# Patient Record
Sex: MALE | Race: BLACK OR AFRICAN AMERICAN | URBAN - METROPOLITAN AREA
[De-identification: names, ages, dates, MRNs, and addresses within clinical notes are randomized per-mention and may not be internally consistent; named-entity substitution may affect disease eponyms.]

---

## 2018-01-26 ENCOUNTER — HOSPITAL ENCOUNTER (EMERGENCY)
Facility: CLINIC | Age: 43
Discharge: HOME | End: 2018-01-26
Attending: EMERGENCY MEDICINE | Admitting: EMERGENCY MEDICINE

## 2018-01-26 VITALS
SYSTOLIC BLOOD PRESSURE: 153 MMHG | HEIGHT: 68 IN | WEIGHT: 315 LBS | OXYGEN SATURATION: 100 % | DIASTOLIC BLOOD PRESSURE: 100 MMHG | BODY MASS INDEX: 47.74 KG/M2 | HEART RATE: 76 BPM | RESPIRATION RATE: 16 BRPM | TEMPERATURE: 98 F

## 2018-01-26 DIAGNOSIS — Z86.79 HISTORY OF CORONARY ARTERY DISEASE: ICD-10-CM

## 2018-01-26 DIAGNOSIS — Z86.2 HISTORY OF SICKLE CELL DISEASE: ICD-10-CM

## 2018-01-26 DIAGNOSIS — R07.9 ACUTE CHEST PAIN: ICD-10-CM

## 2018-01-26 DIAGNOSIS — M25.551 HIP PAIN, RIGHT: ICD-10-CM

## 2018-01-26 LAB
ALBUMIN SERPL-MCNC: 3.4 G/DL (ref 3.4–5)
ALP SERPL-CCNC: 89 U/L (ref 40–150)
ALT SERPL W P-5'-P-CCNC: 24 U/L (ref 0–70)
ANION GAP SERPL CALCULATED.3IONS-SCNC: 5 MMOL/L (ref 3–14)
AST SERPL W P-5'-P-CCNC: 19 U/L (ref 0–45)
BASOPHILS # BLD AUTO: 0 10E9/L (ref 0–0.2)
BASOPHILS NFR BLD AUTO: 0.2 %
BILIRUB SERPL-MCNC: 0.1 MG/DL (ref 0.2–1.3)
BUN SERPL-MCNC: 23 MG/DL (ref 7–30)
CALCIUM SERPL-MCNC: 9.3 MG/DL (ref 8.5–10.1)
CHLORIDE SERPL-SCNC: 109 MMOL/L (ref 94–109)
CO2 SERPL-SCNC: 24 MMOL/L (ref 20–32)
CREAT SERPL-MCNC: 1.94 MG/DL (ref 0.66–1.25)
DIFFERENTIAL METHOD BLD: ABNORMAL
EOSINOPHIL # BLD AUTO: 0.1 10E9/L (ref 0–0.7)
EOSINOPHIL NFR BLD AUTO: 1 %
ERYTHROCYTE [DISTWIDTH] IN BLOOD BY AUTOMATED COUNT: 13.3 % (ref 10–15)
GFR SERPL CREATININE-BSD FRML MDRD: 38 ML/MIN/1.7M2
GLUCOSE SERPL-MCNC: 100 MG/DL (ref 70–99)
HCT VFR BLD AUTO: 31.3 % (ref 40–53)
HGB BLD-MCNC: 10.6 G/DL (ref 13.3–17.7)
IMM GRANULOCYTES # BLD: 0 10E9/L (ref 0–0.4)
IMM GRANULOCYTES NFR BLD: 0.3 %
LYMPHOCYTES # BLD AUTO: 3.5 10E9/L (ref 0.8–5.3)
LYMPHOCYTES NFR BLD AUTO: 40.4 %
MCH RBC QN AUTO: 31 PG (ref 26.5–33)
MCHC RBC AUTO-ENTMCNC: 33.9 G/DL (ref 31.5–36.5)
MCV RBC AUTO: 92 FL (ref 78–100)
MONOCYTES # BLD AUTO: 1.4 10E9/L (ref 0–1.3)
MONOCYTES NFR BLD AUTO: 16.5 %
NEUTROPHILS # BLD AUTO: 3.6 10E9/L (ref 1.6–8.3)
NEUTROPHILS NFR BLD AUTO: 41.6 %
PLATELET # BLD AUTO: 280 10E9/L (ref 150–450)
POTASSIUM SERPL-SCNC: 4.1 MMOL/L (ref 3.4–5.3)
PROT SERPL-MCNC: 7.1 G/DL (ref 6.8–8.8)
RBC # BLD AUTO: 3.42 10E12/L (ref 4.4–5.9)
SODIUM SERPL-SCNC: 138 MMOL/L (ref 133–144)
TROPONIN I SERPL-MCNC: 0.02 UG/L (ref 0–0.04)
WBC # BLD AUTO: 8.7 10E9/L (ref 4–11)

## 2018-01-26 PROCEDURE — 93010 ELECTROCARDIOGRAM REPORT: CPT | Mod: Z6 | Performed by: EMERGENCY MEDICINE

## 2018-01-26 PROCEDURE — 80053 COMPREHEN METABOLIC PANEL: CPT | Performed by: EMERGENCY MEDICINE

## 2018-01-26 PROCEDURE — 93005 ELECTROCARDIOGRAM TRACING: CPT

## 2018-01-26 PROCEDURE — 99284 EMERGENCY DEPT VISIT MOD MDM: CPT | Mod: 25

## 2018-01-26 PROCEDURE — 99284 EMERGENCY DEPT VISIT MOD MDM: CPT | Mod: 25 | Performed by: EMERGENCY MEDICINE

## 2018-01-26 PROCEDURE — 84484 ASSAY OF TROPONIN QUANT: CPT | Performed by: EMERGENCY MEDICINE

## 2018-01-26 PROCEDURE — 85025 COMPLETE CBC W/AUTO DIFF WBC: CPT | Performed by: EMERGENCY MEDICINE

## 2018-01-26 RX ORDER — HYDROXYUREA 500 MG/1
CAPSULE ORAL DAILY
COMMUNITY

## 2018-01-26 RX ORDER — BUDESONIDE AND FORMOTEROL FUMARATE DIHYDRATE 80; 4.5 UG/1; UG/1
2 AEROSOL RESPIRATORY (INHALATION) 2 TIMES DAILY
COMMUNITY

## 2018-01-26 ASSESSMENT — ENCOUNTER SYMPTOMS
EYES NEGATIVE: 1
NEUROLOGICAL NEGATIVE: 1
HEMATOLOGIC/LYMPHATIC NEGATIVE: 1
CHEST TIGHTNESS: 1
ALLERGIC/IMMUNOLOGIC NEGATIVE: 1
CONSTITUTIONAL NEGATIVE: 1
GASTROINTESTINAL NEGATIVE: 1
ENDOCRINE NEGATIVE: 1
PSYCHIATRIC NEGATIVE: 1

## 2018-01-26 NOTE — ED AVS SNAPSHOT
[facility] Emergency Department    [address] [facility] [address] [address]    Phone:  [phone]    Fax:  [phone]                                       Hosea Deutsch   MRN: [id number]    Department:  [facility] Emergency Department   Date of Visit:  1/26/2018           After Visit Summary Signature Page     I have received my discharge instructions, and my questions have been answered. I have discussed any challenges I see with this plan with the nurse or doctor.    ..........................................................................................................................................  Patient/Patient Representative Signature      ..........................................................................................................................................  Patient Representative Print Name and Relationship to Patient    ..................................................               ................................................  Date                                            Time    ..........................................................................................................................................  Reviewed by Signature/Title    ...................................................              ..............................................  Date                                                            Time

## 2018-01-26 NOTE — ED AVS SNAPSHOT
[facility] Emergency Department    [address] [facility] [address]    Phone:  [phone]    Fax:  [phone]                                       Hosea Deutsch   MRN: [id number]    Department:  [facility] Emergency Department   Date of Visit:  1/26/2018           Patient Information     Date Of Birth          1975        Your diagnoses for this visit were:     Acute chest pain resolved on ED arrival after taking 3 sublingual nitroglycerin by report    History of coronary artery disease Reported history of PCI with stents and CABG    History of sickle cell disease        You were seen by Jonathan Alford MD.      Follow-up Information     Follow up with [facility] Emergency Department.    Specialty:  EMERGENCY MEDICINE    Why:  As needed, If symptoms worsen    Contact information:    [address] [place] [id number] [phone]    Additional information:    The medical center is located at   [address] [facility] (between [address] in [place], [place] miles north   of [place]).        Discharge Instructions          *CHEST PAIN, UNCERTAIN CAUSE    Based on your exam today, the exact cause of your chest pain is not certain. Your condition does not seem serious at this time, and your pain does not appear to be coming from your heart. However, sometimes the signs of a serious problem take more time to appear. Therefore, watch for the warning signs listed below.  HOME CARE:  1. Rest today and avoid strenuous activity.  2. Take any prescribed medicine as directed.  FOLLOW UP with your doctor in 1-3 days.   GET PROMPT MEDICAL ATTENTION if any of the following occur:    A change in the type of pain: if it feels different, becomes more severe, lasts longer, or begins to spread into your shoulder, arm, neck, jaw or back    Shortness of breath or increased pain with breathing    Weakness, dizziness, or fainting    Cough with blood or dark colored sputum  (phlegm)    Fever over 101  F (38.3  C)    Swelling, pain or redness in one leg    4322-9832 [employer]. [address] [place] [address], [place] [address]. All rights reserved. This information is not intended as a substitute for professional medical care. Always follow your healthcare professional's instructions.  This information has been modified by your health care provider with permission from the publisher.      24 Hour Appointment Hotline       To make an appointment at any [facility], call [phone] ([phone]). If you don't have a family doctor or clinic, we will help you find one. [facility] clinics are conveniently located to serve the needs of you and your family.             Review of your medicines      Our records show that you are taking the medicines listed below. If these are incorrect, please call your family doctor or clinic.        Dose / Directions Last dose taken    aspirin 81 MG tablet   Dose:  81 mg        Take 81 mg by mouth daily   Refills:  0        budesonide-formoterol 80-4.5 MCG/ACT Inhaler   Commonly known as:  SYMBICORT   Dose:  2 puff        Inhale 2 puffs into the lungs 2 times daily   Refills:  0        CLONIDINE HCL PO   Dose:  0.1 mg        Take 0.1 mg by mouth   Refills:  0        DILAUDID PO   Dose:  2 mg        Take 2 mg by mouth every 2 hours as needed for moderate to severe pain   Refills:  0        ENALAPRIL MALEATE PO        Refills:  0        hydroxyurea 500 MG capsule CHEMO   Commonly known as:  HYDREA        Take by mouth daily   Refills:  0        METOPROLOL TARTRATE PO   Dose:  25 mg        Take 25 mg by mouth 2 times daily   Refills:  0        SIMVASTATIN PO   Dose:  20 mg        Take 20 mg by mouth   Refills:  0                Procedures and tests performed during your visit     CBC with platelets differential    Comprehensive metabolic panel    EKG 12 lead    Troponin I      Orders Needing Specimen Collection     None      Pending Results      No orders found from 1/24/2018 to 1/27/2018.            Pending Culture Results     No orders found from 1/24/2018 to 1/27/2018.            Pending Results Instructions     If you had any lab results that were not finalized at the time of your Discharge, you can call the ED Lab Result RN at [phone]. You will be contacted by this team for any positive Lab results or changes in treatment. The nurses are available 7 days a week from 10A to 6:30P.  You can leave a message 24 hours per day and they will return your call.        Test Results From Your Hospital Stay        1/26/2018  8:20 PM      Component Results     Component Value Ref Range & Units Status    WBC 8.7 4.0 - 11.0 10e9/L Final    RBC Count 3.42 (L) 4.4 - 5.9 10e12/L Final    Hemoglobin 10.6 (L) 13.3 - 17.7 g/dL Final    Hematocrit 31.3 (L) 40.0 - 53.0 % Final    MCV 92 78 - 100 fl Final    MCH 31.0 26.5 - 33.0 pg Final    MCHC 33.9 31.5 - 36.5 g/dL Final    RDW 13.3 10.0 - 15.0 % Final    Platelet Count 280 150 - 450 10e9/L Final    Diff Method Automated Method  Final    % Neutrophils 41.6 % Final    % Lymphocytes 40.4 % Final    % Monocytes 16.5 % Final    % Eosinophils 1.0 % Final    % Basophils 0.2 % Final    % Immature Granulocytes 0.3 % Final    Absolute Neutrophil 3.6 1.6 - 8.3 10e9/L Final    Absolute Lymphocytes 3.5 0.8 - 5.3 10e9/L Final    Absolute Monocytes 1.4 (H) 0.0 - 1.3 10e9/L Final    Absolute Eosinophils 0.1 0.0 - 0.7 10e9/L Final    Absolute Basophils 0.0 0.0 - 0.2 10e9/L Final    Abs Immature Granulocytes 0.0 0 - 0.4 10e9/L Final         1/26/2018  8:39 PM      Component Results     Component Value Ref Range & Units Status    Sodium 138 133 - 144 mmol/L Final    Potassium 4.1 3.4 - 5.3 mmol/L Final    Chloride 109 94 - 109 mmol/L Final    Carbon Dioxide 24 20 - 32 mmol/L Final    Anion Gap 5 3 - 14 mmol/L Final    Glucose 100 (H) 70 - 99 mg/dL Final    Urea Nitrogen 23 7 - 30 mg/dL Final    Creatinine 1.94 (H) 0.66 - 1.25 mg/dL  "Final    GFR Estimate 38 (L) >60 mL/min/1.7m2 Final    Non  GFR Calc    GFR Estimate If Black 46 (L) >60 mL/min/1.7m2 Final    African American GFR Calc    Calcium 9.3 8.5 - 10.1 mg/dL Final    Bilirubin Total 0.1 (L) 0.2 - 1.3 mg/dL Final    Albumin 3.4 3.4 - 5.0 g/dL Final    Protein Total 7.1 6.8 - 8.8 g/dL Final    Alkaline Phosphatase 89 40 - 150 U/L Final    ALT 24 0 - 70 U/L Final    AST 19 0 - 45 U/L Final         2018  8:39 PM      Component Results     Component Value Ref Range & Units Status    Troponin I ES 0.020 0.000 - 0.045 ug/L Final    The 99th percentile for upper reference range is 0.045 ug/L.  Troponin values   in the range of 0.045 - 0.120 ug/L may be associated with risks of adverse   clinical events.                  Thank you for choosing [facility]       Thank you for choosing [facility] for your care. Our goal is always to provide you with excellent care. Hearing back from our patients is one way we can continue to improve our services. Please take a few minutes to complete the written survey that you may receive in the mail after you visit with us. Thank you!        [employer]s Information     [employer]Logs lets you send messages to your doctor, view your test results, renew your prescriptions, schedule appointments and more. To sign up, go to [email or web address] . Click on \"Log in\" on the left side of the screen, which will take you to the Welcome page. Then click on \"Sign up Now\" on the right side of the page.     You will be asked to enter the access code listed below, as well as some personal information. Please follow the directions to create your username and password.     Your access code is: [email or web address]  Expires: 2018  8:48 PM     Your access code will  in 90 days. If you need help or a new code, please call your [facility] clinic or [phone].        Care EveryWhere ID     This is your Care EveryWhere ID. This could be used by other organizations to access " your [facility] medical records  [id number]        Equal Access to Services     MINNIE DE LA CRUZ : Leelee Garcia, elvira boone, wesley riggs, augusto workman. So [facility] [phone].    ATENCIÓN: Si habla español, tiene a hylton disposición servicios gratuitos de asistencia lingüística. Llame al [phone].    We comply with applicable federal civil rights laws and [place] laws. We do not discriminate on the basis of race, color, national origin, age, disability, sex, sexual orientation, or gender identity.            After Visit Summary       This is your record. Keep this with you and show to your community pharmacist(s) and doctor(s) at your next visit.

## 2018-01-27 NOTE — ED NOTES
Pt wants to go out and get his hydromorphone for his hip pain. This nurse instructed against going outside. Told pt I would inform MD of his pain

## 2018-01-27 NOTE — ED NOTES
PICC line accessed and labs obtained. Denies chest pain currently. Labs drawn from red port. NSR on monitor.

## 2018-01-27 NOTE — ED NOTES
States cp started 1.5 hours ago. Took 3 sl ntg and the cp went away. Had a CABG in 2013. MI and 2 stents in 2000. 2015 had mitral valve replacement and 2 stents. Has bone CA and got chemo on Wednesday. Has pic line right arm.

## 2018-01-27 NOTE — ED PROVIDER NOTES
History     Chief Complaint   Patient presents with     Chest Pain     chest pain started 1 hour ago.   stent placement 2013 and 2015   pt had chemo wednesday for bone cancer.     HPI  Hosea Deutsch is a 43 year old male with a reported history of sickle cell disease, history of coronary disease status post CABG with PCI and stent who presents for evaluation of chest pain and discomfort as well as right hip pain.  Patient tells me he is visiting from [facility].  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  He reports he is scheduled for surgery on February 5.  Patient reports he is on multiple medications currently including a baby aspirin daily, enalapril 20 mg a day, hydroxyurea 500 mg a day, and metoprolol 25 mg twice a day.  Patient also reports he takes clonidine 0.1 mg a day, simvastatin 20 mg a day, and Dilaudid 2 mg every 2-3 hours.  He reports an allergy to morphine and Toradol causing rash.  He also reports he is on Lovenox 90 mg twice a day.  He tells me he has a CABG in 2013 and stents in 2000.  He reports he is visiting his partner to celebrate his birthday weekend and [facility].  He reports he had chest pain about 90 minutes prior to ED arrival that began suddenly.  He has a PICC line in his right upper arm.  Patient reports he took sublingual nitro which seemed to resolve his pain.  He came into the emergency department to be checked out.  On arrival in the emergency department he does not endorse any pain or discomfort in the chest.  He also does not report feeling short of breath.  He does report pain in his right hip and femur.  He tells me his pain is due to his osteosarcoma.    Problem List:    There are no active problems to display for this patient.       Past Medical History:    No past medical history on file.    Past Surgical History:    No past surgical history on file.    Family History:    No family history on  "file.    Social History:  Marital Status:    Social History   Substance Use Topics     Smoking status: Not on file     Smokeless tobacco: Not on file     Alcohol use Not on file        Medications:      METOPROLOL TARTRATE PO   aspirin 81 MG tablet   ENALAPRIL MALEATE PO   hydroxyurea (HYDREA) 500 MG capsule CHEMO   budesonide-formoterol (SYMBICORT) 80-4.5 MCG/ACT Inhaler   CLONIDINE HCL PO   SIMVASTATIN PO   HYDROmorphone HCl (DILAUDID PO)         Review of Systems   Constitutional: Negative.    HENT: Negative.    Eyes: Negative.    Respiratory: Positive for chest tightness.    Cardiovascular: Positive for chest pain.   Gastrointestinal: Negative.    Endocrine: Negative.    Genitourinary: Negative.    Musculoskeletal:        Right hip pain   Skin: Negative.    Allergic/Immunologic: Negative.    Neurological: Negative.    Hematological: Negative.    Psychiatric/Behavioral: Negative.    All other systems reviewed and are negative.      Physical Exam   BP: (!) 163/98  Heart Rate: 89  Temp: 97.7  F (36.5  C)  Resp: 18  Height: 172.7 cm (5' 8\")  Weight: (!) 191 kg (421 lb 1.3 oz)  SpO2: 100 %      Physical Exam   Constitutional: He is oriented to person, place, and time. He appears well-developed and well-nourished. No distress.   HENT:   Head: Normocephalic and atraumatic.   Eyes: Conjunctivae and EOM are normal. Pupils are equal, round, and reactive to light. Right eye exhibits no discharge. Left eye exhibits no discharge. No scleral icterus.   Neck: Normal range of motion. Neck supple. No JVD present. No tracheal deviation present. No thyromegaly present.   Cardiovascular: Normal rate and regular rhythm.  Exam reveals no gallop and no friction rub.    No murmur heard.  Pulmonary/Chest: Effort normal and breath sounds normal. No stridor. No respiratory distress. He has no wheezes. He has no rales. He exhibits no tenderness.   Abdominal: Soft. Bowel sounds are normal. He exhibits no distension and no mass. There is no " tenderness. There is no rebound and no guarding.   Lymphadenopathy:     He has no cervical adenopathy.   Neurological: He is alert and oriented to person, place, and time. He displays normal reflexes. No cranial nerve deficit. He exhibits normal muscle tone. Coordination normal.   Skin: No rash noted. He is not diaphoretic. No erythema. No pallor.   Psychiatric: He has a normal mood and affect. His behavior is normal. Judgment and thought content normal.       ED Course     ED Course     Procedures               EKG Interpretation:      Interpreted by Jonathan Alford  Time reviewed:20:00  Symptoms at time of EKG: chest pain   Rhythm: normal sinus   Rate: Normal  Axis: Normal  Ectopy: none  Conduction: normal  ST Segments/ T Waves: Non-specific ST-T wave changes  Q Waves: nonspecific  Comparison to prior: No old EKG available    Clinical Impression: no acute changes        Critical Care time:  none                 ED medications:    ED labs and imaging:  Results for orders placed or performed during the hospital encounter of 01/26/18   CBC with platelets differential   Result Value Ref Range    WBC 8.7 4.0 - 11.0 10e9/L    RBC Count 3.42 (L) 4.4 - 5.9 10e12/L    Hemoglobin 10.6 (L) 13.3 - 17.7 g/dL    Hematocrit 31.3 (L) 40.0 - 53.0 %    MCV 92 78 - 100 fl    MCH 31.0 26.5 - 33.0 pg    MCHC 33.9 31.5 - 36.5 g/dL    RDW 13.3 10.0 - 15.0 %    Platelet Count 280 150 - 450 10e9/L    Diff Method Automated Method     % Neutrophils 41.6 %    % Lymphocytes 40.4 %    % Monocytes 16.5 %    % Eosinophils 1.0 %    % Basophils 0.2 %    % Immature Granulocytes 0.3 %    Absolute Neutrophil 3.6 1.6 - 8.3 10e9/L    Absolute Lymphocytes 3.5 0.8 - 5.3 10e9/L    Absolute Monocytes 1.4 (H) 0.0 - 1.3 10e9/L    Absolute Eosinophils 0.1 0.0 - 0.7 10e9/L    Absolute Basophils 0.0 0.0 - 0.2 10e9/L    Abs Immature Granulocytes 0.0 0 - 0.4 10e9/L   Comprehensive metabolic panel   Result Value Ref Range    Sodium 138 133 - 144 mmol/L     Potassium 4.1 3.4 - 5.3 mmol/L    Chloride 109 94 - 109 mmol/L    Carbon Dioxide 24 20 - 32 mmol/L    Anion Gap 5 3 - 14 mmol/L    Glucose 100 (H) 70 - 99 mg/dL    Urea Nitrogen 23 7 - 30 mg/dL    Creatinine 1.94 (H) 0.66 - 1.25 mg/dL    GFR Estimate 38 (L) >60 mL/min/1.7m2    GFR Estimate If Black 46 (L) >60 mL/min/1.7m2    Calcium 9.3 8.5 - 10.1 mg/dL    Bilirubin Total 0.1 (L) 0.2 - 1.3 mg/dL    Albumin 3.4 3.4 - 5.0 g/dL    Protein Total 7.1 6.8 - 8.8 g/dL    Alkaline Phosphatase 89 40 - 150 U/L    ALT 24 0 - 70 U/L    AST 19 0 - 45 U/L   Troponin I   Result Value Ref Range    Troponin I ES 0.020 0.000 - 0.045 ug/L         ED Vitals:  Vitals:    01/26/18 1950 01/26/18 1951 01/26/18 2000 01/26/18 2015   BP: (!) 165/107  (!) 146/108    Resp:   11 9   Temp:       TempSrc:       SpO2:  99% 99% 98%   Weight:       Height:         Assessments & Plan (with Medical Decision Making)   Clinical impression: 43-year-old -American male who presented for concern for sudden onset of chest pain and discomfort.  Patient tells me he has a history of sickle cell disease, he also has a history of osteosarcoma in his right femur and right hip.  He also reports a history of coronary artery disease. He reports he had PCI with stenting in 2000 and a CABG in 2013.  He is here in [facility] visiting from [facility].  He tells me it is his birthday weekend and is here visiting his partner.  Patient reports he is currently on metoprolol 25 mg twice a day, enalapril 20 mg a day, hydroxyurea 500 mg a day, he takes a baby aspirin daily, he reports he takes Symbicort daily, clonidine 0.1mg, simvastatin 20 mg daily, Dilaudid 2 mg every 2-3 hours as needed.  Patient reports he has a PICC line in place.  He is currently on Lovenox 90 mg twice a day.  He tells me he has had osteosarcoma on 3 different occasions and has been in remission and his cancer recurred about a month ago and is currently on chemotherapy.  He tells me  he last had chemo couple of days ago.  He reports taking 3 sublingual nitroglycerin his chest pain resolved.  Because of his history of coronary artery disease he is here in the emergency department to be assessed and evaluated.  His EKG on ED arrival shows a normal sinus rhythm.  No acute ischemia.  T-wave flattening in aVL.  He has no acute arrhythmia appreciated.  There is no old EKG for comparison.  On my exam he is in no acute distress.  He was initially hypertensive in triage his blood pressure improved from 163/ 6/108      ED Course and Plan:  He is monitored on telemetry.  With his report of history of coronary artery disease with CABG and stents lab was obtained including a troponin.  His arrival troponin which was obtained about 2 hours after his reported onset of symptoms is within normal range of 0.020.  Patient was requesting pain medication.  He was told that he would have to have a ride pick him up from the emergency department if he was given pain medication.  He reports he takes Dilaudid 2 mg every 2-3 hours as needed.  After reviewing hospital policy and options for care for pain management and his request for IV opiates patient elected to go home and take his home medication.  He reports he drove himself to the emergency department but is requesting a cab ride home because he is has pain and discomfort overlying his right hip and femur where he tells me he has a history of osteosarcoma.  He is discharged to home as he remained pain-free his arrival EKG is normal.  Patient certainly has risk factors for coronary artery disease but reports he took his aspirin today.  He was not given any aspirin therapy in the emergency department because he takes aspirin daily.  He was also asymptomatic during my exam and evaluation.  His primary concern and complaint was hip pain.  Patient tells me he cannot drive himself home and needs to be driven home in a cab ride due to pain in his hip.  I offered to  provide pain control for his hip because he takes Dilaudid chronically but that he would have to have a right pick him up and I could not discharge him in a cab after getting IV opiates from the emergency depart.  Patient subsequent left emergency department after his lab results were reviewed with him.  His behavior in the ED is suspicious for drug-seeking behavior.        Disclaimer: This note consists of symbols derived from keyboarding, dictation and/or voice recognition software. As a result, there may be errors in the script that have gone undetected. Please consider this when interpreting information found in this chart.  I have reviewed the nursing notes.    I have reviewed the findings, diagnosis, plan and need for follow up with the patient.       New Prescriptions    No medications on file       Final diagnoses:   Acute chest pain - resolved on ED arrival after taking 3 sublingual nitroglycerin by report   History of coronary artery disease - Reported history of PCI with stents and CABG   History of sickle cell disease   Hip pain, right - Reported history of osteosarcoma on chemotherapy       1/26/2018   [facility] EMERGENCY DEPARTMENT     Jonathan Alford MD  01/27/18 0159

## 2018-01-27 NOTE — DISCHARGE INSTRUCTIONS
*CHEST PAIN, UNCERTAIN CAUSE    Based on your exam today, the exact cause of your chest pain is not certain. Your condition does not seem serious at this time, and your pain does not appear to be coming from your heart. However, sometimes the signs of a serious problem take more time to appear. Therefore, watch for the warning signs listed below.  HOME CARE:  1. Rest today and avoid strenuous activity.  2. Take any prescribed medicine as directed.  FOLLOW UP with your doctor in 1-3 days.   GET PROMPT MEDICAL ATTENTION if any of the following occur:    A change in the type of pain: if it feels different, becomes more severe, lasts longer, or begins to spread into your shoulder, arm, neck, jaw or back    Shortness of breath or increased pain with breathing    Weakness, dizziness, or fainting    Cough with blood or dark colored sputum (phlegm)    Fever over 101  F (38.3  C)    Swelling, pain or redness in one leg    4989-3525 The [employer]. [address] [place] [address]. All rights reserved. This information is not intended as a substitute for professional medical care. Always follow your healthcare professional's instructions.  This information has been modified by your health care provider with permission from the publisher.

## 2018-03-14 NOTE — ED NOTES
Pt complaining of pain, requesting pain meds. MD presented narcotics policy. Pt understanding.    Stat ECG was performed and given to Shantal HERNÁNDEZ.